# Patient Record
Sex: MALE | Race: WHITE | HISPANIC OR LATINO | Employment: FULL TIME | URBAN - METROPOLITAN AREA
[De-identification: names, ages, dates, MRNs, and addresses within clinical notes are randomized per-mention and may not be internally consistent; named-entity substitution may affect disease eponyms.]

---

## 2020-07-20 ENCOUNTER — OFFICE VISIT (OUTPATIENT)
Dept: PRIMARY CARE CLINIC | Facility: CLINIC | Age: 40
End: 2020-07-20
Payer: OTHER GOVERNMENT

## 2020-07-20 VITALS
DIASTOLIC BLOOD PRESSURE: 70 MMHG | HEART RATE: 84 BPM | OXYGEN SATURATION: 95 % | TEMPERATURE: 98 F | SYSTOLIC BLOOD PRESSURE: 118 MMHG | RESPIRATION RATE: 18 BRPM

## 2020-07-20 DIAGNOSIS — Z20.822 SUSPECTED COVID-19 VIRUS INFECTION: Primary | ICD-10-CM

## 2020-07-20 DIAGNOSIS — Z03.818 ENCOUNTER FOR OBSERVATION FOR SUSPECTED EXPOSURE TO OTHER BIOLOGICAL AGENTS RULED OUT: ICD-10-CM

## 2020-07-20 PROCEDURE — U0003 INFECTIOUS AGENT DETECTION BY NUCLEIC ACID (DNA OR RNA); SEVERE ACUTE RESPIRATORY SYNDROME CORONAVIRUS 2 (SARS-COV-2) (CORONAVIRUS DISEASE [COVID-19]), AMPLIFIED PROBE TECHNIQUE, MAKING USE OF HIGH THROUGHPUT TECHNOLOGIES AS DESCRIBED BY CMS-2020-01-R: HCPCS

## 2020-07-20 PROCEDURE — 99203 OFFICE O/P NEW LOW 30 MIN: CPT | Mod: S$GLB,,, | Performed by: NURSE PRACTITIONER

## 2020-07-20 PROCEDURE — 99203 PR OFFICE/OUTPT VISIT, NEW, LEVL III, 30-44 MIN: ICD-10-PCS | Mod: S$GLB,,, | Performed by: NURSE PRACTITIONER

## 2020-07-20 NOTE — PATIENT INSTRUCTIONS
[facility]  Prevenir la propagación del Coronoavirus 2019 en hogares y comunidades residenciales      Pasos preventivos para personas con COVID-19 o que se sospecha que están infectadas (incluidas personas bajo investigación) que no necesitan estar hospitalizadas y personas infectadas con COVID-19 que hayan estado hospitalizadas mihir que se determinan medicamente estables para irse a casa.    Puga médico y el personal de sanidad pública evaluarán si usted puede ser tratado en casa.   Quédese en casa excepto para obtener cuidados médicos.   Sepárese de otras personas y animales en puga hogar.   Llame por teléfono antes de ir a terrence a puga médico.   Póngase jamin mascarilla.   Tápese al toser y estornudar.   Lávese las chuckie con frecuencia.   Evite compartir objetos personales en puga hogar.   Limpie todas las superficies a puga alcance todos los días.    Monitoree katelin síntomas. Consiga ayuda médica si la enfermedad empeora (por ejemplo, dificultad para respirar). Antes de salir a buscar ayuda, llame a puga proveedor médico.    Si tiene jamin emergencia médica y necesita llamar al 911, notifique al personal que responda a puga llamada de que usted tiene, o está siendo evaluado para COVID-19. Si es posible, póngase jamin mascarilla antes de que la ayuda sanitaria llegue.   Dejar de hacer aislamiento en casa. Llame a puga médico para que le asesore sobre si puede dejar de hacer aislamiento en casa.    Precauciones recomendadas para miembros del mismo hogar, compañeros íntimos y cuidadores, fuera del ámbito médico, de un paciente sintomático confirmado positivo para COVID-19 o un paciente que está siendo investigado.  Miembros del mismo hogar, compañeros íntimos y cuidadores, fuera del ámbito médico, pueden kendal estado en contacto con jamin persona con síntomas confirmada positivo para COVID-19 o jamin persona bajo investigación. Personas con contacto cercano deberían monitorizar puga matt; deberían  llamar a puga proveedor médico inmediatamente si desarrollan síntomas que sugieren que puede kendal contraído COVID-19 (por ejemplo, fiebre, tos, falta de aliento).    Personas con contacto cercano deberían, además, seguir las siguientes recomendaciones:   Asegúrese de que usted puede entender y ayudar al paciente a seguir las instrucciones de puga proveedor médico, en relación con la medicación y el cuidado a seguir. Debería ayudar al paciente con katelin necesidades básicas en casa y ayudar cuando sea necesario a hacer la compra, ir a recoger las recetas médicas y otras necesidades personales.   Monitorear los síntomas del paciente. Si el paciente empeora, llame a puga proveedor médico y dígale que el paciente hernandez sido confirmado positivo para COVID-19. Hersey ayudará a la oficina de puga médico a julius las medidas adecuadas para evitar que otras personas en la oficina o en la abrahan de espera se infecten. Pida al proveedor médico que llame al departamento de matt del estado para más instrucciones. Si el paciente tiene jamin emergencia médica y tiene que llamar al 911, informe al operador que responda a puga llamada de que el paciente tiene o está siendo evaluado para COVID-19.   Miembros del mismo hogar deberían quedarse en habitaciones separadas del paciente lo jocelyn posible. Miembros del mismo hogar deberían utilizar otro dormitorio y cuarto de baño, si fuera posible.    Prohibir la visita de cualquier persona que no necesite estar en la casa.   Miembros del mismo hogar deberían ocuparse de las mascotas de la casa. No cuide de animales o mascotas mientras esté enfermo.   Asegúrese de que las áreas comunes de la casa tienen buena ventilación, yoly aire acondicionado o jamin ventana que se pueda abrir si el clima lo permite.   Lávese las chuckie frecuentemente. Lávese las chuckie frecuentemente con jabón y agua flor al menos 20 segundos o utilice un desinfectante de chuckie con base de alcohol, que contenga entre 60 y 95% de  alcohol. Cubriendo todas las superficies de las chuckie y frotándolas juntas hasta que se sequen.   Evite tocarse los ojos, la nariz y la boca antes de haberse lavado las chuckie.   El paciente debería llevar mascarilla cuando esté con otras personas. Si el paciente no se puede poner jamin mascarilla porque, por ejemplo, tiene dificultad para respirar, usted, yoly cuidador, debería ponerse jamin mascarilla cuando esté en la misma habitación que el paciente.   Utilice jamin mascarilla desechable y guantes cuando toque o esté en contacto con la latricia del paciente, katelin heces, katelin fluidos corporales tales yoly saliva, esputo, mucosidad nasal, vómito, orina.   o Tire las mascarillas desechables y los guantes lori pues de utilizarlos. No los reutilice.  o Cuando se quite la protección, sherie quítese los guantes. Inmediatamente después lávese las chuckie con agua y jabón o con un desinfectante de chuckie con base de alcohol. Después quítese y tire la mascarilla, e inmediatamente después lávese las chuckie otra vez con agua y jabón o utilice un desinfectante de chuckie con base de alcohol.   Evite compartir objetos del hogar con el paciente. No debería compartir platos, vasos, tazas, cubiertos, toallas, ropa de cama u otros objetos. Después de que el paciente utilice estos objetos debe lavarlos minuciosamente (thomas debajo Matt la colada minuciosamente).   Limpie todas las superficies de fácil alcance, yoly las encimeras, las mesas, los pomos de las corazon, los picaportes del baño, el retrete, teléfonos, teclados, tabletas y las mesillas de noche, todos los días. Además, lave cualquier superficie que pueda tener latricia, heces o fluidos corporales.   Utilice los productos de limpieza o las toallitas según indiquen las etiquetas de los mismos. Las etiquetas contienen la información para utilizar estos productos de manera amaya y eficiente, además de las precauciones que debe julius al utilizar dichos productos, tales yoly el uso de  guantes o buena ventilación.   Lave la colada minuciosamente.  o Retire inmediatamente cualquier prenda o ropa de cama que tenga latricia, heces o fluidos corporales.  o Utilice guantes desechables cuando toque objetos sucios y separe los objetos sucios de puga cuerpo. Lávese las chuckie (con jabón y agua o con un desinfectante de chuckie con base de alcohol) inmediatamente después de quitarse los guantes.  o Fabi y siga las instrucciones en las etiquetas de la ropa y el detergente. En general, utilice un detergente corriente siguiendo las instrucciones de la lavadora y séquelo meticulosamente utilizando la temperatura mas carissa recomendada en la etiqueta de la ropa.   Coloque todos los guantes desechables, las mascarillas y otros objetos contaminados en un contenedor reforzado antes de tirarlo junto con otros desechos del hogar. Lávese las chuckie (con jabón y agua o con un desinfectante de chuckie con base de alcohol) inmediatamente después de tocar estos objetos. Si las chuckie están visiblemente sucias se recomienda lavarlas con agua y con jabón.    Consulte cualquier otra pregunta con puga departamento de matt local o estatal o con puga proveedor médico. Compruebe las horas en las que se puede contactar al departamento de matt local.    Para más información, siga el enlace del CDC que encontrará a continuación.    https://www.cdc.gov/coronavirus/2019-ncov/hcp/guidance-prevent-spread-sp.html

## 2020-07-20 NOTE — PROGRESS NOTES
Subjective:        Time seen by provider: 9:08 AM on 07/20/2020    Inocencio Starkey is a 39 y.o. male who presents for an evaluation of possible COVID-19. He complains of a fever and cough. The patient states his cough began 2 weeks ago and reports having a fever last week. He reports no known positive exposure. No pulmonary PMHx or PSHx.     Review of Systems   Constitutional: Positive for fever. Negative for activity change, appetite change and fatigue.   HENT: Negative for congestion, rhinorrhea and sore throat.    Respiratory: Positive for cough. Negative for chest tightness, shortness of breath and wheezing.    Cardiovascular: Negative for chest pain and palpitations.   Gastrointestinal: Negative for diarrhea, nausea and vomiting.   Musculoskeletal: Negative for arthralgias and myalgias.   Skin: Negative for rash.   Neurological: Negative for weakness, light-headedness and headaches.       Objective:      Physical Exam  Vitals signs and nursing note reviewed.   Constitutional:       General: He is not in acute distress.     Appearance: He is well-developed. He is not diaphoretic.   HENT:      Head: Normocephalic and atraumatic.      Nose: Nose normal.   Eyes:      Conjunctiva/sclera: Conjunctivae normal.   Neck:      Musculoskeletal: Normal range of motion.   Cardiovascular:      Rate and Rhythm: Normal rate and regular rhythm.      Heart sounds: Normal heart sounds. No murmur.   Pulmonary:      Effort: Pulmonary effort is normal. No respiratory distress.      Breath sounds: Normal breath sounds. No wheezing.   Musculoskeletal: Normal range of motion.   Skin:     General: Skin is warm and dry.   Neurological:      Mental Status: He is alert and oriented to person, place, and time.         Assessment:       1. Suspected Covid-19 Virus Infection    2. Encounter for observation for suspected exposure to other biological agents ruled out        Plan:       1. Suspected Covid-19 Virus Infection      2.  Encounter for observation for suspected exposure to other biological agents ruled out  - COVID-19 Routine Screening    2. Discharge home and await results.   3. Return to clinic or ED for new or worsening symptoms.   4. Follow-up with PCP as needed.     Scribe Attestation:   I, Asha Alberto, am scribing for, and in the presence of, TAMICA Valenzuela. I performed the above scribed service and the documentation accurately describes the services I performed. I attest to the accuracy of the note.    I, TAMICA Green, personally performed the services described in this documentation. All medical record entries made by the scribe were at my direction and in my presence.  I have reviewed the chart and agree that the record reflects my personal performance and is accurate and complete. TAMICA Green.  9:17 AM 07/20/2020

## 2020-07-21 DIAGNOSIS — U07.1 COVID-19 VIRUS DETECTED: ICD-10-CM

## 2020-07-21 LAB — SARS-COV-2 RNA RESP QL NAA+PROBE: DETECTED
